# Patient Record
Sex: FEMALE | Race: WHITE | NOT HISPANIC OR LATINO | Employment: OTHER | URBAN - METROPOLITAN AREA
[De-identification: names, ages, dates, MRNs, and addresses within clinical notes are randomized per-mention and may not be internally consistent; named-entity substitution may affect disease eponyms.]

---

## 2022-05-31 ENCOUNTER — TELEPHONE (OUTPATIENT)
Dept: PEDIATRICS | Facility: CLINIC | Age: 32
End: 2022-05-31

## 2022-05-31 NOTE — TELEPHONE ENCOUNTER
----- Message from Chapo Bautista sent at 5/31/2022 12:24 PM CDT -----  Patient is returning a phone call.  Who left a message for the patient: Yudith  Does patient know what this is regarding:  she wants to know if you go to another hospital if one of your patient were admitted such as: [facility]

## 2022-05-31 NOTE — TELEPHONE ENCOUNTER
----- Message from Harpreet Ragsdale sent at 5/31/2022 10:01 AM CDT -----  Contact: [phone]  Calling to speak with nurse regarding future pt . Please call back [phone] . Thanks/lucía